# Patient Record
(demographics unavailable — no encounter records)

---

## 2025-01-29 NOTE — REVIEW OF SYSTEMS
[Fever] : no fever [Night Sweats] : no night sweats [Palpitations] : no palpitations [Cough] : no cough [Nausea] : no nausea [Dysuria] : no dysuria [Hematuria] : no hematuria [Easy Bleeding] : no easy bleeding [Easy Bruising] : no easy bruising

## 2025-01-29 NOTE — HEALTH RISK ASSESSMENT
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

## 2025-01-29 NOTE — HISTORY OF PRESENT ILLNESS
[de-identified] : No nausea, vomiting, diarrhea, and constipation. No chest pain or shortness of breath.  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  Ran [employer] [employer] this year.  Has [facility] scheduled.  No nausea, vomiting, diarrhea, and constipation. No chest pain or shortness of breath. Recent STD screening and all neg.  Question of HSV 2 ab's positive in past and now negative with no clinical suspicion. Injured L wrist 2 years ago while working out with weights. Recurrent LBP and has PT on/off.

## 2025-01-29 NOTE — PHYSICAL EXAM
[No Acute Distress] : no acute distress [Well Nourished] : well nourished [PERRL] : pupils equal round and reactive to light [Normal TMs] : both tympanic membranes were normal [Supple] : supple [Clear to Auscultation] : lungs were clear to auscultation bilaterally [Regular Rhythm] : with a regular rhythm [Soft] : abdomen soft [Coordination Grossly Intact] : coordination grossly intact [Normal Gait] : normal gait [Speech Grossly Normal] : speech grossly normal [Normal Mood] : the mood was normal

## 2025-07-30 NOTE — HISTORY OF PRESENT ILLNESS
[FreeTextEntry1] : High cholesterol [de-identified] : Compliant with Vitamin D 2000 iu daily.  No nausea, vomiting, diarrhea, and constipation. No chest pain or shortness of breath.  Improved diet somewhat.  Also was in a new relationship and has been having some ED issues early on which seem to be resolving but was concerned about his libido as well.

## 2025-07-30 NOTE — HEALTH RISK ASSESSMENT
[0] : 2) Feeling down, depressed, or hopeless: Not at all (0) [PHQ-2 Negative - No further assessment needed] : PHQ-2 Negative - No further assessment needed [Never] : Never [[id number]] : 0

## 2025-07-30 NOTE — PHYSICAL EXAM
[No Acute Distress] : no acute distress [Coordination Grossly Intact] : coordination grossly intact [Normal Gait] : normal gait [Speech Grossly Normal] : speech grossly normal [Normal Mood] : the mood was normal